# Patient Record
Sex: FEMALE | Race: WHITE | NOT HISPANIC OR LATINO | Employment: UNEMPLOYED | URBAN - NONMETROPOLITAN AREA
[De-identification: names, ages, dates, MRNs, and addresses within clinical notes are randomized per-mention and may not be internally consistent; named-entity substitution may affect disease eponyms.]

---

## 2021-11-12 ENCOUNTER — OFFICE VISIT (OUTPATIENT)
Dept: OBSTETRICS AND GYNECOLOGY | Facility: CLINIC | Age: 31
End: 2021-11-12

## 2021-11-12 VITALS
SYSTOLIC BLOOD PRESSURE: 116 MMHG | BODY MASS INDEX: 35.43 KG/M2 | HEIGHT: 69 IN | DIASTOLIC BLOOD PRESSURE: 78 MMHG | WEIGHT: 239.2 LBS

## 2021-11-12 DIAGNOSIS — N92.1 MENORRHAGIA WITH IRREGULAR CYCLE: Primary | ICD-10-CM

## 2021-11-12 DIAGNOSIS — Z32.00 POSSIBLE PREGNANCY, NOT CONFIRMED: ICD-10-CM

## 2021-11-12 LAB
ERYTHROCYTE [DISTWIDTH] IN BLOOD BY AUTOMATED COUNT: 13 % (ref 12.3–15.4)
HCG INTACT+B SERPL-ACNC: <0.5 MIU/ML
HCT VFR BLD AUTO: 43 % (ref 34–46.6)
HGB BLD-MCNC: 14.2 G/DL (ref 12–15.9)
MCH RBC QN AUTO: 28.5 PG (ref 26.6–33)
MCHC RBC AUTO-ENTMCNC: 33 G/DL (ref 31.5–35.7)
MCV RBC AUTO: 86.2 FL (ref 79–97)
PLATELET # BLD AUTO: 211 10*3/MM3 (ref 140–450)
RBC # BLD AUTO: 4.99 10*6/MM3 (ref 3.77–5.28)
TSH SERPL DL<=0.005 MIU/L-ACNC: 1.43 UIU/ML (ref 0.27–4.2)
WBC # BLD AUTO: 9.11 10*3/MM3 (ref 3.4–10.8)

## 2021-11-12 PROCEDURE — 99203 OFFICE O/P NEW LOW 30 MIN: CPT | Performed by: PHYSICIAN ASSISTANT

## 2021-11-12 NOTE — PATIENT INSTRUCTIONS
We will check labs today and contact patient with results when available.  She is reassured pelvic exam is normal and we will monitor her periods for the next 2-3 cycles.  She is counseled regarding timing intercourse with ovulation and she is encouraged to start prenatal vitamin at this time.

## 2021-11-12 NOTE — PROGRESS NOTES
Subjective   Chief Complaint   Patient presents with   • Menstrual Problem     Patient states that last period lasted for 6 weeks.  Trying for pregnancy.  Patient states she started the irregular bleeding after Covid vaccine.       Emelyn Fish is a 31 y.o. year old  presenting to be seen for irregular bleeding.  Patient feels that the irregular bleeding began after getting Covid vaccine second dose in August.  She reports her last menstrual period was  which started at the right time however after her normal 5 to 6-day flow she continued to have some light bleeding and spotting until bleeding stopped 1 week ago.  She reports that she got a last Depo-Provera injection 2020.  She has not used birth control since stopping her Depo-Provera and is wanting to conceive.  She reports that she was having a regular monthly cycle while on Depo-Provera, and also had regular monthly cycles prior to starting the Depo-Provera.  Her  is 44 years old and has not fathered children.  He is reportedly healthy  States she had a normal Pap smear 1 year ago in [place]    Past Medical History:   Diagnosis Date   • Bipolar disorder (HCC)    • PMS (premenstrual syndrome)       No current outpatient medications on file.   Allergies   Allergen Reactions   • Amoxicillin-Pot Clavulanate Diarrhea   • Ampicillin Diarrhea   • Methylphenidate Hcl Rash      Past Surgical History:   Procedure Laterality Date   • MOUTH SURGERY     • WISDOM TOOTH EXTRACTION        Social History     Socioeconomic History   • Marital status:    Tobacco Use   • Smoking status: Former Smoker   • Smokeless tobacco: Never Used   Vaping Use   • Vaping Use: Never used   Substance and Sexual Activity   • Alcohol use: Defer   • Drug use: Never   • Sexual activity: Yes     Partners: Male     Birth control/protection: None      Family History   Problem Relation Age of Onset   • No Known Problems Father    • No Known Problems Mother   "      Review of Systems   Constitutional: Negative for chills, diaphoresis and fever.   Gastrointestinal: Negative for constipation, diarrhea, nausea and vomiting.   Genitourinary: Positive for menstrual problem. Negative for difficulty urinating, dysuria, pelvic pain, vaginal bleeding and vaginal discharge.           Objective   /78   Ht 175.3 cm (69\")   Wt 109 kg (239 lb 3.2 oz)   LMP 10/01/2021 (Exact Date)   Breastfeeding No   BMI 35.32 kg/m²     Physical Exam  Constitutional:       Appearance: Normal appearance. She is well-developed and well-groomed.   Eyes:      General: Lids are normal.      Extraocular Movements: Extraocular movements intact.      Conjunctiva/sclera: Conjunctivae normal.   Abdominal:      Palpations: Abdomen is soft.      Tenderness: There is no abdominal tenderness.   Genitourinary:     Labia:         Right: No rash or tenderness.         Left: No rash or tenderness.       Urethra: No prolapse, urethral pain, urethral swelling or urethral lesion.      Vagina: No vaginal discharge, tenderness or lesions.      Cervix: No cervical motion tenderness, discharge, friability or lesion.      Uterus: Not enlarged and not tender.       Adnexa:         Right: No mass or tenderness.          Left: No mass or tenderness.     Skin:     General: Skin is warm and dry.      Findings: No bruising or lesion.   Neurological:      Mental Status: She is alert.   Psychiatric:         Attention and Perception: Attention normal.         Mood and Affect: Mood normal.         Speech: Speech normal.         Behavior: Behavior is cooperative.            Result Review :                   Assessment and Plan  Diagnoses and all orders for this visit:    1. Menorrhagia with irregular cycle (Primary)  -     CBC (No Diff)  -     HCG, B-subunit, Quantitative ([facility] Only)  -     TSH Rfx On Abnormal To Free T4    2. Possible pregnancy, not confirmed  -     Cancel: POC Pregnancy, Urine      Patient Instructions "   We will check labs today and contact patient with results when available.  She is reassured pelvic exam is normal and we will monitor her periods for the next 2-3 cycles.  She is counseled regarding timing intercourse with ovulation and she is encouraged to start prenatal vitamin at this time.             This note was electronically signed.    Nata Campa PA-C   November 12, 2021

## 2022-10-06 ENCOUNTER — OFFICE VISIT (OUTPATIENT)
Dept: OBSTETRICS AND GYNECOLOGY | Facility: CLINIC | Age: 32
End: 2022-10-06

## 2022-10-06 VITALS
SYSTOLIC BLOOD PRESSURE: 120 MMHG | DIASTOLIC BLOOD PRESSURE: 78 MMHG | HEIGHT: 69 IN | BODY MASS INDEX: 37.74 KG/M2 | WEIGHT: 254.8 LBS

## 2022-10-06 DIAGNOSIS — N91.2 AMENORRHEA: Primary | ICD-10-CM

## 2022-10-06 DIAGNOSIS — Z31.9 DESIRE FOR PREGNANCY: ICD-10-CM

## 2022-10-06 PROCEDURE — 99214 OFFICE O/P EST MOD 30 MIN: CPT | Performed by: PHYSICIAN ASSISTANT

## 2022-10-06 NOTE — PROGRESS NOTES
Subjective   Chief Complaint   Patient presents with   • Amenorrhea     No menses since .  HCG done a few weeks ago by PCP that was negative       Emelyn Fish is a 31 y.o. year old  presenting to be seen for amenorrhea.  Patient reports her last menstrual period was 6/15/2022.  She has had some random light spotting episodes since  but not a normal menstrual cycle.  She reports prior to her menses in  she was having regular monthly cycles since stopping her Depo-Provera 2020.  She is not using birth control and is desiring pregnancy.  Her partner is 45 years old and has not fathered children.  Reports that he has done a semen analysis over-the-counter which was normal.  She reports about a 30 pound weight gain but attributes that to stopping smoking in .  Her primary care provider did pregnancy test about 4 weeks ago which was negative    Past Medical History:   Diagnosis Date   • Bipolar disorder (HCC)    • PMS (premenstrual syndrome)       No current outpatient medications on file.   Allergies   Allergen Reactions   • Amoxicillin-Pot Clavulanate Diarrhea   • Ampicillin Diarrhea   • Methylphenidate Hcl Rash      Past Surgical History:   Procedure Laterality Date   • MOUTH SURGERY     • WISDOM TOOTH EXTRACTION        Social History     Socioeconomic History   • Marital status:    Tobacco Use   • Smoking status: Former Smoker   • Smokeless tobacco: Never Used   Vaping Use   • Vaping Use: Never used   Substance and Sexual Activity   • Alcohol use: Defer   • Drug use: Never   • Sexual activity: Yes     Partners: Male     Birth control/protection: None      Family History   Problem Relation Age of Onset   • No Known Problems Father    • No Known Problems Mother        Review of Systems   Constitutional: Negative for chills, diaphoresis and fever.   Gastrointestinal: Negative for abdominal pain, constipation, diarrhea, nausea and vomiting.   Genitourinary: Positive for menstrual  "problem. Negative for difficulty urinating, dysuria and pelvic pain.           Objective   /78   Ht 175.3 cm (69\")   Wt 116 kg (254 lb 12.8 oz)   LMP 06/15/2022   Breastfeeding No   BMI 37.63 kg/m²     Physical Exam  Constitutional:       Appearance: Normal appearance. She is well-developed and well-groomed. She is morbidly obese.   Eyes:      General: Lids are normal.      Extraocular Movements: Extraocular movements intact.      Conjunctiva/sclera: Conjunctivae normal.   Genitourinary:     Labia:         Right: No rash, tenderness or lesion.         Left: No rash, tenderness or lesion.       Urethra: No prolapse, urethral pain, urethral swelling or urethral lesion.      Vagina: No vaginal discharge, tenderness or lesions.      Cervix: No cervical motion tenderness, discharge, friability or lesion.      Uterus: Not enlarged and not tender.       Adnexa:         Right: No mass or tenderness.          Left: No mass or tenderness.     Skin:     General: Skin is warm and dry.      Findings: No bruising or lesion.   Neurological:      Mental Status: She is alert.   Psychiatric:         Attention and Perception: Attention normal.         Mood and Affect: Mood normal.         Speech: Speech normal.         Behavior: Behavior is cooperative.            Result Review :                   Assessment and Plan  Diagnoses and all orders for this visit:    1. Amenorrhea (Primary)  -     Testosterone (Free & Total), LC / MS  -     Comprehensive Metabolic Panel  -     DHEA-Sulfate  -     Estradiol  -     Follicle Stimulating Hormone  -     Hemoglobin A1c  -     TSH  -     Prolactin  -     HCG, B-subunit, Quantitative ([facility] Only)  -     US Non-ob Transvaginal    2. Desire for pregnancy      Patient Instructions   Will check labs today  Follow up 2 weeks for pelvic ultrasound and review labs             This note was electronically signed.    Nata Campa PA-C   October 6, 2022  "

## 2022-10-10 LAB
ALBUMIN SERPL-MCNC: 4.5 G/DL (ref 3.9–5)
ALBUMIN/GLOB SERPL: 1.8 {RATIO} (ref 1.2–2.2)
ALP SERPL-CCNC: 109 IU/L (ref 44–121)
ALT SERPL-CCNC: 18 IU/L (ref 0–32)
AST SERPL-CCNC: 19 IU/L (ref 0–40)
BILIRUB SERPL-MCNC: 0.2 MG/DL (ref 0–1.2)
BUN SERPL-MCNC: 11 MG/DL (ref 6–20)
BUN/CREAT SERPL: 13 (ref 9–23)
CALCIUM SERPL-MCNC: 9.7 MG/DL (ref 8.7–10.2)
CHLORIDE SERPL-SCNC: 100 MMOL/L (ref 96–106)
CO2 SERPL-SCNC: 21 MMOL/L (ref 20–29)
CREAT SERPL-MCNC: 0.86 MG/DL (ref 0.57–1)
DHEA-S SERPL-MCNC: 356 UG/DL (ref 84.8–378)
EGFRCR SERPLBLD CKD-EPI 2021: 94 ML/MIN/1.73
ESTRADIOL SERPL-MCNC: 33.1 PG/ML
FSH SERPL-ACNC: 9.3 MIU/ML
GLOBULIN SER CALC-MCNC: 2.5 G/DL (ref 1.5–4.5)
GLUCOSE SERPL-MCNC: 78 MG/DL (ref 70–99)
HBA1C MFR BLD: 5.4 % (ref 4.8–5.6)
HCG INTACT+B SERPL-ACNC: <1 MIU/ML
POTASSIUM SERPL-SCNC: 4 MMOL/L (ref 3.5–5.2)
PROLACTIN SERPL-MCNC: 4.7 NG/ML (ref 4.8–23.3)
PROT SERPL-MCNC: 7 G/DL (ref 6–8.5)
SODIUM SERPL-SCNC: 139 MMOL/L (ref 134–144)
TESTOST FREE SERPL-MCNC: 4.3 PG/ML (ref 0–4.2)
TESTOST SERPL-MCNC: 31.2 NG/DL (ref 10–55)
TSH SERPL DL<=0.005 MIU/L-ACNC: 1.26 UIU/ML (ref 0.45–4.5)

## 2022-10-10 NOTE — PROGRESS NOTES
Please inform patient that her labs have returned and the only abnormality is her free testosterone is minimally elevated.  Recommend she keep her follow-up appointment for pelvic ultrasound scheduled later this month and it looks like she also is scheduled to see Dr. Levi that same day.

## 2022-10-27 ENCOUNTER — OFFICE VISIT (OUTPATIENT)
Dept: OBSTETRICS AND GYNECOLOGY | Facility: CLINIC | Age: 32
End: 2022-10-27

## 2022-10-27 VITALS — SYSTOLIC BLOOD PRESSURE: 110 MMHG | BODY MASS INDEX: 37.69 KG/M2 | DIASTOLIC BLOOD PRESSURE: 60 MMHG | WEIGHT: 255.2 LBS

## 2022-10-27 DIAGNOSIS — N91.2 AMENORRHEA: Primary | ICD-10-CM

## 2022-10-27 PROCEDURE — 99213 OFFICE O/P EST LOW 20 MIN: CPT | Performed by: OBSTETRICS & GYNECOLOGY

## 2022-10-27 RX ORDER — MEDROXYPROGESTERONE ACETATE 10 MG/1
10 TABLET ORAL DAILY
Qty: 10 TABLET | Refills: 0 | Status: SHIPPED | OUTPATIENT
Start: 2022-10-27 | End: 2023-01-31

## 2022-10-27 NOTE — PROGRESS NOTES
Subjective   Chief Complaint   Patient presents with   • Follow-up     2 Week follow up ASHA     Emelyn Fish is a 32 y.o. year old .  Patient's last menstrual period was 2022.  She presents to be seen because of amenorrhea for 3 months.  PCO and hormone labs normal..     OTHER COMPLAINTS:  Nothing else    The following portions of the patient's history were reviewed and updated as appropriate:  She  has a past medical history of Bipolar disorder (HCC) and PMS (premenstrual syndrome).  She does not have a problem list on file.  She  has a past surgical history that includes [place] tooth extraction and Mouth surgery.  Her family history includes No Known Problems in her father and mother.  She  reports that she has quit smoking. She has never used smokeless tobacco. Alcohol use questions deferred to the physician. She reports that she does not use drugs.  No current outpatient medications on file.     No current facility-administered medications for this visit.     No current outpatient medications on file prior to visit.     No current facility-administered medications on file prior to visit.     She is allergic to amoxicillin-pot clavulanate, ampicillin, and methylphenidate hcl.    Social History    Tobacco Use      Smoking status: Former      Smokeless tobacco: Never    Review of Systems  Consitutional POS: nothing reported    NEG: anorexia or night sweats   Gastointestinal POS: nothing reported    NEG: bloating, change in bowel habits, melena or reflux symptoms   Genitourinary POS: nothing reported    NEG: dysuria or hematuria   Integument POS: nothing reported    NEG: moles that are changing in size, shape, color or rashes   Breast POS: nothing reported    NEG: persistent breast lump, skin dimpling or nipple discharge         Pertinent items are noted in HPI.          Objective   /60   Wt 116 kg (255 lb 3.2 oz)   LMP 2022   BMI 37.69 kg/m²     General:  well developed; well  nourished  no acute distress   Skin:  Not performed.   Thyroid: not examined   Lungs:  breathing is unlabored   Heart:  Not performed.   Breasts:  Not performed.   Abdomen: soft, non-tender; no masses  no umbilical or inguinal hernias are present  no hepato-splenomegaly   Pelvis: Not performed.     Psychiatric: Alert and oriented ×3, mood and affect appropriate  HEENT: Atraumatic, normocephalic, normal scleral icterus  Extremities: 2+ pulses bilaterally, no edema      Lab Review   CBC, CMP, FSH, PRL and TSH    Imaging   Pelvic ultrasound images independantly reviewed - Uterus is anteverted normal size and shape.  Endometrium modestly thickened at 12.7 mm.  Left ovary normal.  Right ovary simple 2.7 cm cyst.  No free fluid or masses        Assessment   1. Amenorrhea: labs and TVS normal save slightly thickened endometrium     Plan   1. Prvera 10mg po qday x 10 days  2. Call if no menses    No orders of the defined types were placed in this encounter.         This note was electronically signed.      October 27, 2022

## 2023-01-31 ENCOUNTER — OFFICE VISIT (OUTPATIENT)
Dept: OBSTETRICS AND GYNECOLOGY | Facility: CLINIC | Age: 33
End: 2023-01-31
Payer: COMMERCIAL

## 2023-01-31 VITALS
DIASTOLIC BLOOD PRESSURE: 70 MMHG | HEIGHT: 69 IN | WEIGHT: 255 LBS | BODY MASS INDEX: 37.77 KG/M2 | SYSTOLIC BLOOD PRESSURE: 120 MMHG

## 2023-01-31 DIAGNOSIS — Z31.9 DESIRE FOR PREGNANCY: ICD-10-CM

## 2023-01-31 DIAGNOSIS — N91.5 OLIGOMENORRHEA, UNSPECIFIED TYPE: Primary | ICD-10-CM

## 2023-01-31 LAB
B-HCG UR QL: NEGATIVE
EXPIRATION DATE: NORMAL
INTERNAL NEGATIVE CONTROL: NEGATIVE
INTERNAL POSITIVE CONTROL: POSITIVE
Lab: NORMAL

## 2023-01-31 PROCEDURE — 99214 OFFICE O/P EST MOD 30 MIN: CPT | Performed by: PHYSICIAN ASSISTANT

## 2023-01-31 PROCEDURE — 81025 URINE PREGNANCY TEST: CPT | Performed by: PHYSICIAN ASSISTANT

## 2023-01-31 RX ORDER — PROGESTERONE 200 MG/1
CAPSULE ORAL
Qty: 20 CAPSULE | Refills: 0 | Status: SHIPPED | OUTPATIENT
Start: 2023-01-31

## 2023-01-31 NOTE — PROGRESS NOTES
Subjective   Chief Complaint   Patient presents with   • Menstrual Problem     Patient states that she hasn't had a period since November. UPT-Negative       Emelyn Fish is a 32 y.o. year old  presenting to be seen for irregular menses  States menses irregular since stopping DMPA 2020. Will sometimes go 60-90 days between cycles  LMp 2022 after taking provera for withdrawal bleed. UPT negative in office today  She desires pregnancy and attempting pregnancy for several months now  Had unremarkable ultrasound in October  PCOS labs in October noted minimally elevated free testosterone level.  Thyroid screening normal   49 yo and has not fathered children. Reports he did an over the counter semen test a few years ago and was normal.     Past Medical History:   Diagnosis Date   • Bipolar disorder (HCC)    • PMS (premenstrual syndrome)         Current Outpatient Medications:   •  clomiPHENE (CLOMID) 50 MG tablet, One tablet po days 5-9 of cycle, Disp: 5 tablet, Rfl: 0  •  metFORMIN (Glucophage) 500 MG tablet, Take one tablet po with evening meal for 1 week then increase to bid with meals, Disp: 60 tablet, Rfl: 3  •  Progesterone (Prometrium) 200 MG capsule, Take 2 capsules po at hs for 10 nights, Disp: 20 capsule, Rfl: 0   Allergies   Allergen Reactions   • Amoxicillin-Pot Clavulanate Diarrhea   • Ampicillin Diarrhea   • Methylphenidate Hcl Rash      Past Surgical History:   Procedure Laterality Date   • MOUTH SURGERY     • WISDOM TOOTH EXTRACTION        Social History     Socioeconomic History   • Marital status:    Tobacco Use   • Smoking status: Former   • Smokeless tobacco: Never   Vaping Use   • Vaping Use: Never used   Substance and Sexual Activity   • Alcohol use: Defer   • Drug use: Never   • Sexual activity: Yes     Partners: Male     Birth control/protection: None      Family History   Problem Relation Age of Onset   • No Known Problems Father    • No Known Problems Mother   "      Review of Systems   Constitutional: Negative for chills, diaphoresis and fever.   Gastrointestinal: Negative.    Genitourinary: Positive for menstrual problem. Negative for difficulty urinating, dysuria, pelvic pain and vaginal discharge.           Objective   /70   Ht 175.3 cm (69\")   Wt 116 kg (255 lb)   LMP 11/08/2022 (Exact Date)   BMI 37.66 kg/m²     Physical Exam  Constitutional:       Appearance: Normal appearance. She is well-developed and well-groomed.   Eyes:      General: Lids are normal.      Extraocular Movements: Extraocular movements intact.      Conjunctiva/sclera: Conjunctivae normal.   Neurological:      General: No focal deficit present.      Mental Status: She is alert and oriented to person, place, and time.   Psychiatric:         Attention and Perception: Attention normal.         Mood and Affect: Mood normal.         Speech: Speech normal.         Behavior: Behavior is cooperative.            Result Review :                   Assessment and Plan  Diagnoses and all orders for this visit:    1. Oligomenorrhea, unspecified type (Primary)  -     POC Pregnancy, Urine    2. Desire for pregnancy    Other orders  -     Progesterone (Prometrium) 200 MG capsule; Take 2 capsules po at hs for 10 nights  Dispense: 20 capsule; Refill: 0  -     metFORMIN (Glucophage) 500 MG tablet; Take one tablet po with evening meal for 1 week then increase to bid with meals  Dispense: 60 tablet; Refill: 3  -     clomiPHENE (CLOMID) 50 MG tablet; One tablet po days 5-9 of cycle  Dispense: 5 tablet; Refill: 0      Patient Instructions   Prometrium for withdrawal bleed  May start metformin to increase chance of spontaneous ovulations  May try a round of Clomid 50 mg with withdrawal bleed. Patient advised to call office first day of bleed for specific clomid instructions  Recommend  have semen analysis. May request order for this from his PCP               This note was electronically signed.    Nata " SCOTT Campa PA-C   January 31, 2023

## 2023-01-31 NOTE — PATIENT INSTRUCTIONS
Prometrium for withdrawal bleed  May start metformin to increase chance of spontaneous ovulations  May try a round of Clomid 50 mg with withdrawal bleed. Patient advised to call office first day of bleed for specific clomid instructions  Recommend  have semen analysis. May request order for this from his PCP

## 2023-02-02 ENCOUNTER — TELEPHONE (OUTPATIENT)
Dept: OBSTETRICS AND GYNECOLOGY | Facility: CLINIC | Age: 33
End: 2023-02-02
Payer: COMMERCIAL

## 2023-02-02 NOTE — TELEPHONE ENCOUNTER
Patient called and stated that she has been diagnosed with a heart murmur and would like to know if the Metformin would interfere with the murmur.

## 2023-08-02 ENCOUNTER — OFFICE VISIT (OUTPATIENT)
Dept: OBSTETRICS AND GYNECOLOGY | Facility: CLINIC | Age: 33
End: 2023-08-02
Payer: COMMERCIAL

## 2023-08-02 VITALS — BODY MASS INDEX: 37.01 KG/M2 | DIASTOLIC BLOOD PRESSURE: 78 MMHG | WEIGHT: 250.6 LBS | SYSTOLIC BLOOD PRESSURE: 120 MMHG

## 2023-08-02 DIAGNOSIS — Z01.419 ENCOUNTER FOR GYNECOLOGICAL EXAMINATION WITHOUT ABNORMAL FINDING: Primary | ICD-10-CM

## 2023-08-02 DIAGNOSIS — N91.2 AMENORRHEA: ICD-10-CM

## 2023-08-02 RX ORDER — PROGESTERONE 200 MG/1
CAPSULE ORAL
Qty: 20 CAPSULE | Refills: 12 | Status: SHIPPED | OUTPATIENT
Start: 2023-08-02

## 2023-08-03 LAB — HCG INTACT+B SERPL-ACNC: <1 MIU/ML

## 2023-08-04 LAB — REF LAB TEST METHOD: NORMAL

## 2023-08-15 ENCOUNTER — TELEPHONE (OUTPATIENT)
Dept: OBSTETRICS AND GYNECOLOGY | Facility: CLINIC | Age: 33
End: 2023-08-15
Payer: COMMERCIAL

## 2023-08-15 NOTE — TELEPHONE ENCOUNTER
Provider: DR. CABELLO    Caller: SKYLA WALL    Relationship to Patient: SELF      Phone Number: [phone]    Reason for Call: PT HAS TAKEN PER PROGESTERONE (PROMETRIUM) FOR THE 10 DAYS STARTING ON 8-3. SHE IS SUPPOSED TO TAKE CLOMID AFTER STARTING HER CYCLE ON DAYS 5-9. HOWEVER, SHE HAS NOT STARTED HER CYCLE YET. AND ALSO WANTS TO KNOW IF SHE IS SUPPOSED TO HAVE MORE BLOODWORK DONE. COULD SOMEONE CALL HER ABOUT THIS PLS? ANYTIME, CAN LVM IF NA.    When was the patient last seen: 8-2

## 2023-09-08 DIAGNOSIS — Z31.9 DESIRE FOR PREGNANCY: Primary | ICD-10-CM

## 2023-09-13 LAB — PROGEST SERPL-MCNC: 0.1 NG/ML

## 2023-10-20 DIAGNOSIS — Z31.9 DESIRE FOR PREGNANCY: Primary | ICD-10-CM

## 2023-10-21 LAB — PROGEST SERPL-MCNC: 0.3 NG/ML

## 2023-10-24 ENCOUNTER — TELEPHONE (OUTPATIENT)
Dept: OBSTETRICS AND GYNECOLOGY | Facility: CLINIC | Age: 33
End: 2023-10-24
Payer: COMMERCIAL

## 2023-10-24 NOTE — TELEPHONE ENCOUNTER
Attempted to call patient, no answer no voicemail setup. If patient calls back, please schedule her an appointment per , as she is still not ovulating.      Thanks,    Shreya

## 2023-10-24 NOTE — TELEPHONE ENCOUNTER
----- Message from Emelyn Fish sent at 10/23/2023  4:31 PM EDT -----  Regarding: Progesterone  Contact: [phone]  What does Progesterone mean? Has it came up since checked the last time? Also is a good that my Progesterone has came up? I'm sorry I ask so many questions.